# Patient Record
Sex: FEMALE | Race: WHITE
[De-identification: names, ages, dates, MRNs, and addresses within clinical notes are randomized per-mention and may not be internally consistent; named-entity substitution may affect disease eponyms.]

---

## 2020-06-26 ENCOUNTER — HOSPITAL ENCOUNTER (OUTPATIENT)
Dept: HOSPITAL 74 - FASU | Age: 60
Discharge: HOME | End: 2020-06-26
Attending: INTERNAL MEDICINE
Payer: COMMERCIAL

## 2020-06-26 VITALS — HEART RATE: 71 BPM | DIASTOLIC BLOOD PRESSURE: 79 MMHG | SYSTOLIC BLOOD PRESSURE: 120 MMHG

## 2020-06-26 VITALS — TEMPERATURE: 97.7 F

## 2020-06-26 VITALS — BODY MASS INDEX: 27.8 KG/M2

## 2020-06-26 DIAGNOSIS — K63.89: ICD-10-CM

## 2020-06-26 DIAGNOSIS — K57.30: ICD-10-CM

## 2020-06-26 DIAGNOSIS — K92.1: Primary | ICD-10-CM

## 2020-06-26 DIAGNOSIS — K52.89: ICD-10-CM

## 2020-06-26 DIAGNOSIS — K64.0: ICD-10-CM

## 2020-06-26 DIAGNOSIS — K51.40: ICD-10-CM

## 2020-06-26 PROCEDURE — 0DBL8ZX EXCISION OF TRANSVERSE COLON, VIA NATURAL OR ARTIFICIAL OPENING ENDOSCOPIC, DIAGNOSTIC: ICD-10-PCS | Performed by: INTERNAL MEDICINE

## 2020-06-26 PROCEDURE — 0DBH8ZX EXCISION OF CECUM, VIA NATURAL OR ARTIFICIAL OPENING ENDOSCOPIC, DIAGNOSTIC: ICD-10-PCS | Performed by: INTERNAL MEDICINE

## 2020-06-26 PROCEDURE — 0DBP8ZX EXCISION OF RECTUM, VIA NATURAL OR ARTIFICIAL OPENING ENDOSCOPIC, DIAGNOSTIC: ICD-10-PCS | Performed by: INTERNAL MEDICINE

## 2020-06-26 PROCEDURE — 0DBK8ZX EXCISION OF ASCENDING COLON, VIA NATURAL OR ARTIFICIAL OPENING ENDOSCOPIC, DIAGNOSTIC: ICD-10-PCS | Performed by: INTERNAL MEDICINE

## 2020-06-26 PROCEDURE — 0DBM8ZX EXCISION OF DESCENDING COLON, VIA NATURAL OR ARTIFICIAL OPENING ENDOSCOPIC, DIAGNOSTIC: ICD-10-PCS | Performed by: INTERNAL MEDICINE

## 2020-06-26 PROCEDURE — 0DBN8ZX EXCISION OF SIGMOID COLON, VIA NATURAL OR ARTIFICIAL OPENING ENDOSCOPIC, DIAGNOSTIC: ICD-10-PCS | Performed by: INTERNAL MEDICINE

## 2020-07-01 NOTE — PATH
Surgical Pathology Report



Patient Name:  BETTY AGUIRRE

Accession #:  

[facility]. Rec. #:  [id number]                                                        

   /Age/Gender:  1960 (Age: 60) / F

Account:  [id number]                                                          

             Location: [facility], [facility] AMBULATORY 

Taken:  2020

Received:  2020

Reported:  2020

Physicians:  Betty Michele M.D.

  



Specimen(s) Received

A: CECUM 

B: ASCENDING COLON 

C: PROXIMAL TRANSVERSE COLON 

D: MID TRANSVERSE COLON 

E: DISTAL TRANSVERSE COLON (PSEUDOPOLYP) 

F: DISTAL TRANSVERSE COLON # 2 

G: PROXIMAL DESCENDING COLON 

H: DISTAL DESCENDING COLON 

I: SIGMOID COLON @ 30 CM 

J: SIGMOID COLON @ 20 CM 

K: RECTOSIGMOID 

L: RECTUM 





Clinical History

Ulcerative colitis







Final Diagnosis

A. CECUM, BIOPSY:

COLONIC MUCOSA WITH NO PATHOLOGIC FINDINGS.

NEGATIVE FOR COLITIS.



B. ASCENDING COLON, BIOPSY:

COLONIC MUCOSA WITH NO PATHOLOGIC FINDINGS.

NEGATIVE FOR COLITIS.



C. PROXIMAL TRANSVERSE COLON, BIOPSY:

COLONIC MUCOSA WITH NO PATHOLOGIC FINDINGS.

NEGATIVE FOR COLITIS.



D. MID TRANSVERSE COLON, BIOPSY:

COLONIC MUCOSA WITH NO PATHOLOGIC FINDINGS.

NEGATIVE FOR COLITIS.



E. DISTAL TRANSVERSE COLON (PSEUDOPOLYP), BIOPSY:

COLONIC MUCOSA SHOWING MILD SURFACE HYPERPLASTIC CHANGE.

NEGATIVE FOR COLITIS.



F. DISTAL TRANSVERSE COLON #2, BIOPSY:

COLONIC MUCOSA WITH NO PATHOLOGIC FINDINGS.

NEGATIVE FOR COLITIS.



G. PROXIMAL DESCENDING COLON, BIOPSY:

COLONIC MUCOSA SHOWING BENIGN/REACTIVE LYMPHOID AGGREGATE.

NEGATIVE FOR COLITIS.



H. DISTAL DESCENDING COLON, BIOPSY:

COLONIC MUCOSA WITH NO PATHOLOGIC FINDINGS.

NEGATIVE FOR COLITIS.



I. SIGMOID COLON AT 30 CM, BIOPSY:

MODERATE CHRONIC ACTIVE COLITIS.

NEGATIVE FOR DYSPLASIA.



J. SIGMOID COLON AT 20 CM, BIOPSY:

MODERATE CHRONIC ACTIVE COLITIS.

NEGATIVE FOR DYSPLASIA.



K. RECTOSIGMOID, BIOPSY:

MODERATE CHRONIC ACTIVE COLITIS.

NEGATIVE FOR DYSPLASIA.



L. RECTUM, BIOPSY:

SEVERE CHRONIC ACTIVE PROCTITIS.

NEGATIVE FOR DYSPLASIA.





***Electronically Signed***

Ambar Lynch M.D.





Gross Description

A.  Received in formalin, labeled "cecum" are 2 tan, irregular portions of soft

tissue averaging 0.3 cm. in greatest dimension. The specimens are submitted in

toto in one cassette.



B.  Received in formalin, labeled "ascending colon" are 3 tan, irregular

portions of soft tissue ranging from 0.1-0.3 cm. in greatest dimension. The

specimens are submitted in toto in one cassette.



C.  Received in formalin, labeled "proximal transverse colon" are 2 tan,

irregular portions of soft tissue measuring 0.4 and 0.5 cm. in greatest

dimension. The specimens are submitted in toto in one cassette.



D.  Received in formalin, labeled "mid transverse colon" are 2 tan, irregular

portions of soft tissue measuring 0.1 and 0.3 cm. in greatest dimension. The

specimens are submitted in toto in one cassette.



E.  Received in formalin, labeled "distal transverse colon" are 2 tan, irregular

portions of soft tissue measuring 0.2 and 0.3 cm. in greatest dimension. The

specimens are submitted in toto in one cassette.



F.  Received in formalin, labeled "distal transverse colon #2" is a tan,

irregular portion of soft tissue measuring 0.3 cm. in greatest dimension. The

specimen is submitted in toto in one cassette.



G.  Received in formalin, labeled "proximal descending colon" are 2 tan,

irregular portions of soft tissue measuring 0.3 and 0.6 cm. in greatest

dimension. The specimens are submitted in toto in one cassette.



H.  Received in formalin, labeled "distal descending colon" is a tan, irregular

portion of soft tissue measuring 0.3 cm. in greatest dimension. The specimen is

submitted in toto in one cassette.



I.  Received in formalin, labeled "sigmoid at 30 cm" are 2 tan, irregular

portions of soft tissue measuring 0.4 and 0.5 cm. in greatest dimension. The

specimens are submitted in toto in one cassette.



J.  Received in formalin, labeled "sigmoid at 20 cm" are 2 tan, irregular

portions of soft tissue averaging 0.2 cm. in greatest dimension. The specimens

are submitted in toto in one cassette.



K.  Received in formalin, labeled "rectosigmoid" are 3 tan, irregular portions

of soft tissue ranging from 0.2-0.3 cm. in greatest dimension. The specimens are

submitted in toto in one cassette.



L.  Received in formalin, labeled "rectum" is a tan, irregular portion of soft

tissue measuring 0.4 cm. in greatest dimension. The specimen is submitted in

toto in one cassette.

DL



saudi

## 2021-03-09 ENCOUNTER — HOSPITAL ENCOUNTER (OUTPATIENT)
Dept: HOSPITAL 74 - FASU-ENDO | Age: 61
Discharge: HOME | End: 2021-03-09
Attending: INTERNAL MEDICINE
Payer: COMMERCIAL

## 2021-03-09 VITALS — BODY MASS INDEX: 30.1 KG/M2

## 2021-03-09 VITALS — SYSTOLIC BLOOD PRESSURE: 122 MMHG | DIASTOLIC BLOOD PRESSURE: 72 MMHG | HEART RATE: 71 BPM

## 2021-03-09 VITALS — TEMPERATURE: 97.4 F

## 2021-03-09 DIAGNOSIS — K51.90: Primary | ICD-10-CM

## 2021-03-09 DIAGNOSIS — K29.70: ICD-10-CM

## 2021-03-09 DIAGNOSIS — K22.8: ICD-10-CM

## 2021-03-09 DIAGNOSIS — K44.9: ICD-10-CM

## 2021-03-09 DIAGNOSIS — D12.3: ICD-10-CM

## 2021-03-09 DIAGNOSIS — K64.1: ICD-10-CM

## 2021-03-09 DIAGNOSIS — Z87.19: ICD-10-CM

## 2021-03-09 DIAGNOSIS — K52.9: ICD-10-CM

## 2021-03-09 DIAGNOSIS — K31.89: ICD-10-CM

## 2021-03-09 PROCEDURE — 0DB98ZX EXCISION OF DUODENUM, VIA NATURAL OR ARTIFICIAL OPENING ENDOSCOPIC, DIAGNOSTIC: ICD-10-PCS | Performed by: INTERNAL MEDICINE

## 2021-03-09 PROCEDURE — 0DBL8ZX EXCISION OF TRANSVERSE COLON, VIA NATURAL OR ARTIFICIAL OPENING ENDOSCOPIC, DIAGNOSTIC: ICD-10-PCS | Performed by: INTERNAL MEDICINE

## 2021-03-09 PROCEDURE — 0DB78ZX EXCISION OF STOMACH, PYLORUS, VIA NATURAL OR ARTIFICIAL OPENING ENDOSCOPIC, DIAGNOSTIC: ICD-10-PCS | Performed by: INTERNAL MEDICINE

## 2021-03-09 PROCEDURE — 0DB48ZX EXCISION OF ESOPHAGOGASTRIC JUNCTION, VIA NATURAL OR ARTIFICIAL OPENING ENDOSCOPIC, DIAGNOSTIC: ICD-10-PCS | Performed by: INTERNAL MEDICINE

## 2023-02-27 ENCOUNTER — NON-APPOINTMENT (OUTPATIENT)
Age: 63
End: 2023-02-27

## 2023-02-27 ENCOUNTER — APPOINTMENT (OUTPATIENT)
Dept: ORTHOPEDIC SURGERY | Facility: CLINIC | Age: 63
End: 2023-02-27
Payer: COMMERCIAL

## 2023-02-27 PROBLEM — Z00.00 ENCOUNTER FOR PREVENTIVE HEALTH EXAMINATION: Status: ACTIVE | Noted: 2023-02-27

## 2023-02-27 PROCEDURE — 20553 NJX 1/MLT TRIGGER POINTS 3/>: CPT

## 2023-02-27 PROCEDURE — 73030 X-RAY EXAM OF SHOULDER: CPT | Mod: LT

## 2023-02-27 PROCEDURE — 99203 OFFICE O/P NEW LOW 30 MIN: CPT | Mod: 25

## 2023-02-27 RX ORDER — TRAMADOL HYDROCHLORIDE 50 MG/1
50 TABLET, COATED ORAL
Qty: 56 | Refills: 0 | Status: ACTIVE | COMMUNITY
Start: 2023-02-27 | End: 1900-01-01
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
## 2023-02-27 NOTE — PROCEDURE
[de-identified] : TRIGGER POINT INJECTIONS\par \par Patient has demonstrated limited relief from NSAIDS, rest, exercises / PT, and after discussion of the risks and benefits, the patient elected to proceed with a trigger point injection into the \par LEFT LEVATOR and RHOMBOID x2 \par \par \par  I confirmed no prior adverse reactions, no active infections, and no relevant allergies. \par The skin was prepped in the usual sterile manner. The site was injected with local anesthetic followed by local needling. \par The injection was completed without complication and a bandage was applied.\par  \par The patient tolerated the procedure well and was given post-injection instructions. Cold Tx x 48 hours, analgesics. prn \par Medications: 1.5cc of 1% xylocaine + 1.5cc .25% Bupivicaine + KENALOG 1MG  per site\par

## 2023-02-27 NOTE — PHYSICAL EXAM
[de-identified] : PHYSICAL EXAM LEFT  SHOULDER-RHD\par \par CERVICAL AROM NORMAL\par SPURLING NEGATIVE LEFT AND RIGHT \par \par MODERATE  SCAPULAR PROTRACTION\par AROM 140 / 140 / 90 / 30\par TENDER: LEVATOR AND RHOMBOID X 2 \par \par SPECIAL TESTING :\par EDWARDS - NEGATIVE \par HALLE - NEGATIVE  \par SPEED TEST - NEGATIVE \par \par NICHOLAS - NEGATIVE \par APPREHENSION AND SUPPRESSION - NEGATIVE \par \par RC STRENGTH TESTING \par SS:  5/5\par SUB 5/5\par IS     5/5\par BICEPS  5/5\par \par SENSATION  - GROSSLY INTACT\par \par \par  [de-identified] : LEFT SHOULDER XRAY (2 VIEWS - AP AND OUTLET) -  \par NO OBVIOUS FRACTURE , SEPARATION OR DISLOCATION \par NO SIGNIFICANT OSTEOARTHRITIS, \par TYPE 3B ACROMION \par CSA=28.2\par

## 2023-03-15 ENCOUNTER — APPOINTMENT (OUTPATIENT)
Dept: ORTHOPEDIC SURGERY | Facility: CLINIC | Age: 63
End: 2023-03-15
Payer: COMMERCIAL

## 2023-03-15 DIAGNOSIS — M25.512 PAIN IN LEFT SHOULDER: ICD-10-CM

## 2023-03-15 DIAGNOSIS — M54.2 CERVICALGIA: ICD-10-CM

## 2023-03-15 PROCEDURE — 20553 NJX 1/MLT TRIGGER POINTS 3/>: CPT | Mod: LT

## 2023-03-15 PROCEDURE — 99213 OFFICE O/P EST LOW 20 MIN: CPT | Mod: 25

## 2023-03-15 NOTE — DISCUSSION/SUMMARY
[de-identified] : PATIENT HAS ELECTED TO PROCEED WITH TRIGGER POINT INJECTIONS SHOULDER \par RISKS AND BENEFITS DISCUSSED - VERBAL CONSENT OBTAINED \par \par \par POST INJECTION INSTRUCTIONS\par \par COLD THERAPY , ANALGESICS PRN\par \par HOME STRETCHING AND EXERCISES QD  - TRIGGER POINTS - neck and shoulder HANDOUT PROVIDED, REVIEWED AND DEMONSTRATED \par \par CONSIDER  P.T.  2 WEEKS AFTER INJECTION - 2 X 4 WEEKS \par \par MRI C SPINE \par
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
## 2023-03-15 NOTE — PHYSICAL EXAM
[de-identified] : PHYSICAL EXAM LEFT  SHOULDER-RHD\par \par CERVICAL AROM NORMAL\par SPURLING NEGATIVE LEFT AND RIGHT \par \par MODERATE  SCAPULAR PROTRACTION\par AROM 140 / 140 / 90 / 30\par TENDER: LEVATOR AND RHOMBOID X 2 \par \par SPECIAL TESTING :\par EDWARDS - NEGATIVE \par HALLE - NEGATIVE  \par SPEED TEST - NEGATIVE \par \par NICHOLAS - NEGATIVE \par APPREHENSION AND SUPPRESSION - NEGATIVE \par \par RC STRENGTH TESTING \par SS:  5/5\par SUB 5/5\par IS     5/5\par BICEPS  5/5\par \par SENSATION  - GROSSLY INTACT\par \par \par

## 2023-03-15 NOTE — PROCEDURE
[de-identified] : TRIGGER POINT INJECTIONS\par \par Patient has demonstrated limited relief from NSAIDS, rest, exercises / PT, and after discussion of the risks and benefits, the patient elected to proceed with a trigger point injection into the \par LEFT LEVATOR and RHOMBOID x2 \par \par \par  I confirmed no prior adverse reactions, no active infections, and no relevant allergies. \par The skin was prepped in the usual sterile manner. The site was injected with local anesthetic followed by local needling. \par The injection was completed without complication and a bandage was applied.\par  \par The patient tolerated the procedure well and was given post-injection instructions. Cold Tx x 48 hours, analgesics. prn \par Medications: 1.5cc of 1% xylocaine + 1.5cc.25% Bupivicaine + KENALOG 1MG per site

## 2023-03-31 ENCOUNTER — APPOINTMENT (OUTPATIENT)
Dept: ORTHOPEDIC SURGERY | Facility: CLINIC | Age: 63
End: 2023-03-31
Payer: COMMERCIAL

## 2023-03-31 DIAGNOSIS — M54.12 RADICULOPATHY, CERVICAL REGION: ICD-10-CM

## 2023-03-31 PROCEDURE — 99213 OFFICE O/P EST LOW 20 MIN: CPT
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
## 2023-03-31 NOTE — DISCUSSION/SUMMARY
[de-identified] : PATIENT HAS CONTINUED SENSORY CHANGES AND NUMBNESS IN THE LEFT UPPER EXTREMITY FOREARM AND HAND NO RELIEF FROM TRIGGER POINT INJECTIONS\par \par MRI REVEALS INTACT CERVICAL FUSION C5-6 FROM PREVIOUS SURGERY.  THERE APPEARS TO BE MODERATE TO SEVERE FORAMINAL NARROWING AT THE LEVEL ABOVE THAT C4-5\par \par I HAVE REFERRED PATIENT TO DR. PAREDES CONSIDERATION OF CONSERVATIVE VERSUS SURGICAL TREATMENT\par \par I HAVE OFFERED PATIENT REPEAT TRIGGER POINT INJECTION SHE DEFERRED SINCE IT PROVIDED NO RELIEF OF HER SENSORY CHANGES

## 2023-03-31 NOTE — PHYSICAL EXAM
[de-identified] : PHYSICAL EXAM LEFT  SHOULDER-RHD\par \par CERVICAL AROM NORMAL\par SPURLING EQUIVOCAL  LEFT \par \par MODERATE  SCAPULAR PROTRACTION\par AROM 140 / 140 / 90 / 30\par TENDER: LEVATOR AND RHOMBOID X 2 \par \par SPECIAL TESTING :\par EDWARDS - NEGATIVE \par HALLE - NEGATIVE  \par SPEED TEST - NEGATIVE \par \par NICHOLAS - NEGATIVE \par APPREHENSION AND SUPPRESSION - NEGATIVE \par \par RC STRENGTH TESTING \par SS:  5/5\par SUB 5/5\par IS     5/5\par BICEPS  5/5\par \par SENSATION  - GROSSLY INTACT\par \par \par  [de-identified] : Date of Exam: 03-\par  \par EXAM:  MRI CERVICAL SPINE WITHOUT CONTRAST\par \par \par IMPRESSION:  MRI of the cervical spine demonstrates:\par \par 1.  New large right thyroid mass. Thyroid ultrasound correlation recommended for further evaluation.\par 2.  Anterior fusion at C5-6, now with solid fusion of the posterior disc space. No change in alignment. No evidence of complications.\par 3.  Progressive degenerative changes at C4-5, now with moderate to severe bilateral neural foraminal stenosis.\par 4.  Otherwise multilevel degenerative changes, without other sites of central or foraminal stenosis.\par 5.  Reversal of cervical lordosis, without significant change.\par

## 2024-09-10 ENCOUNTER — HOSPITAL ENCOUNTER (OUTPATIENT)
Dept: HOSPITAL 74 - FASU-ENDO | Age: 64
Discharge: HOME | End: 2024-09-10
Attending: INTERNAL MEDICINE
Payer: COMMERCIAL

## 2024-09-10 VITALS — TEMPERATURE: 97.6 F | HEART RATE: 63 BPM

## 2024-09-10 VITALS — RESPIRATION RATE: 18 BRPM

## 2024-09-10 VITALS — DIASTOLIC BLOOD PRESSURE: 84 MMHG | SYSTOLIC BLOOD PRESSURE: 146 MMHG

## 2024-09-10 VITALS — BODY MASS INDEX: 25.9 KG/M2

## 2024-09-10 DIAGNOSIS — K51.40: ICD-10-CM

## 2024-09-10 DIAGNOSIS — Z87.19: Primary | ICD-10-CM

## 2024-09-10 DIAGNOSIS — K64.1: ICD-10-CM

## 2024-09-10 PROCEDURE — 0DBH8ZX EXCISION OF CECUM, VIA NATURAL OR ARTIFICIAL OPENING ENDOSCOPIC, DIAGNOSTIC: ICD-10-PCS | Performed by: INTERNAL MEDICINE

## 2024-09-10 PROCEDURE — 0DBM8ZX EXCISION OF DESCENDING COLON, VIA NATURAL OR ARTIFICIAL OPENING ENDOSCOPIC, DIAGNOSTIC: ICD-10-PCS | Performed by: INTERNAL MEDICINE

## 2024-09-10 PROCEDURE — 0DBL8ZX EXCISION OF TRANSVERSE COLON, VIA NATURAL OR ARTIFICIAL OPENING ENDOSCOPIC, DIAGNOSTIC: ICD-10-PCS | Performed by: INTERNAL MEDICINE

## 2024-09-10 PROCEDURE — 0DBP8ZX EXCISION OF RECTUM, VIA NATURAL OR ARTIFICIAL OPENING ENDOSCOPIC, DIAGNOSTIC: ICD-10-PCS | Performed by: INTERNAL MEDICINE

## 2024-09-10 PROCEDURE — 0DBN8ZX EXCISION OF SIGMOID COLON, VIA NATURAL OR ARTIFICIAL OPENING ENDOSCOPIC, DIAGNOSTIC: ICD-10-PCS | Performed by: INTERNAL MEDICINE

## 2024-09-10 PROCEDURE — 0DBK8ZX EXCISION OF ASCENDING COLON, VIA NATURAL OR ARTIFICIAL OPENING ENDOSCOPIC, DIAGNOSTIC: ICD-10-PCS | Performed by: INTERNAL MEDICINE
